# Patient Record
Sex: FEMALE | Race: WHITE
[De-identification: names, ages, dates, MRNs, and addresses within clinical notes are randomized per-mention and may not be internally consistent; named-entity substitution may affect disease eponyms.]

---

## 2017-08-03 NOTE — UC
Abdominal Pain Female HPI





- HPI Summary


HPI Summary: 





Abdominal pain, cramping, gas, discomfort, frequent very small loose/formed 

stools starting 3 nights ago.  had similar symptoms starting 2 days 

before pt, but his symptoms resolved within about 48 hours. Pt is just 

concerned that her  recovered quickly and her symptoms are persistent. 

Has nausea without vomiting but eating helps her belly sx. No blood in stool, 

denies urinary or vaginal symptoms. Has some low back pain with radiation down 

the legs. 





- History of Current Complaint


Hx Obtained From: Patient


Pregnant?: No


Onset/Duration: Sudden Onset, Lasting Days


Timing: Constant


Severity Initially: Moderate


Severity Currently: Moderate


Location: Diffuse


Radiates: Yes


Radiates to: Back


Character: Aching, Cramping


Aggravating Factor(s): Nothing


Alleviating Factor(s): Bowel Movement, Other: - eating/drinking


Associated Signs and Symptoms: Positive: Decreased Appetite, Nausea.  Negative: 

Diaphoresis, Fever, Cough, Constipation, Blood in Stool, Urinary Symptoms, 

Vaginal Bleeding, Vomiting





<Sofie Cordero - Last Filed: 08/03/17 17:03>





<Vesna Wiseman - Last Filed: 08/03/17 17:44>





- History of Current Complaint


Chief Complaint: UCGI


Stated Complaint: GASTRIC/GAS


Time Seen by Provider: 08/03/17 16:43


Allergies/Adverse Reactions: 


 Allergies











Allergy/AdvReac Type Severity Reaction Status Date / Time


 


Penicillins Allergy  Rash Verified 08/03/17 16:39














PMH/Surg Hx/FS Hx/Imm Hx


Previously Healthy: Yes





- Surgical History


Surgical History: None





- Family History


Known Family History: Positive: Hypertension





- Social History


Lives: With Family


Alcohol Use: None


Substance Use Type: None


Smoking Status (MU): Never Smoked Tobacco





<Sofie Cordero - Last Filed: 08/03/17 17:03>





Review of Systems


Constitutional: Negative


Skin: Negative


Eyes: Negative


ENT: Negative


Respiratory: Negative


Cardiovascular: Negative


Gastrointestinal: Abdominal Pain, Diarrhea, Nausea


Genitourinary: Negative


Motor: Negative


Neurovascular: Negative


Musculoskeletal: Negative


Neurological: Negative


Psychological: Negative


All Other Systems Reviewed And Are Negative: Yes





<Sofie Cordero - Last Filed: 08/03/17 17:03>





Physical Exam


Triage Information Reviewed: Yes


Appearance: Well-Appearing, No Pain Distress, Well-Nourished


Vital Signs: 


 Initial Vital Signs











Temp  98.7 F   08/03/17 16:35


 


Pulse  81   08/03/17 16:35


 


Resp  12   08/03/17 16:35


 


BP  148/99   08/03/17 16:35


 


Pulse Ox  100   08/03/17 16:35











Vital Signs Reviewed: Yes


Eye Exam: Normal, Other - PERRL


Eyes: Positive: Conjunctiva Clear


ENT Exam: Normal


ENT: Positive: Normal ENT inspection, Hearing grossly normal, Pharynx normal, 

TMs normal


Dental Exam: Normal


Dental: Negative: Gross Decay/Caries @, Abscess @


Neck exam: Normal


Neck: Positive: Supple, Nontender, No Lymphadenopathy


Respiratory Exam: Normal


Respiratory: Positive: Chest non-tender, Lungs clear, Normal breath sounds, No 

respiratory distress, No accessory muscle use


Cardiovascular Exam: Normal


Cardiovascular: Positive: RRR, No Murmur


Abdomen Description: Positive: Nontender, No Organomegaly, Soft.  Negative: 

Bruit, CVA Tenderness (R), CVA Tenderness (L), Hepatomegaly, McBurney's Point 

Tenderness, Peritoneal Signs


Musculoskeletal Exam: Normal


Neurological Exam: Normal


Neurological: Positive: Alert


Psychological Exam: Normal


Skin Exam: Normal





<Sofie Cordero - Last Filed: 08/03/17 17:03>


Vital Signs: 


 Initial Vital Signs











Temp  98.7 F   08/03/17 16:35


 


Pulse  81   08/03/17 16:35


 


Resp  12   08/03/17 16:35


 


BP  148/99   08/03/17 16:35


 


Pulse Ox  100   08/03/17 16:35














<Vesna Wiseman - Last Filed: 08/03/17 17:44>





Abd Pain Female Course/Dx





- Differential Dx/Diagnosis


Provider Diagnoses: Acute enteritis





<Sofie Cordero - Last Filed: 08/03/17 17:03>





Discharge





<Sofie Cordero - Last Filed: 08/03/17 17:03>





<Vesna Wiseman - Last Filed: 08/03/17 17:44>





- Discharge Plan


Condition: Stable


Disposition: HOME


Prescriptions: 


Dicyclomine CAP* [Bentyl CAP*] 10 mg PO TID PRN #12 cap


 PRN Reason: cramping


Patient Education Materials:  Acute Diarrhea (ED)


Additional Instructions: 


As we discussed, I do not see any "red flags" in your symptoms or on exam. I 

expect your stomach cramping and bloating to normalize within the next 2-3 days 

(it may take another couple days for your bowel habits to normalize as well), 

as this is most likely a GI virus. 





If symptoms persist or worsen, please fill the specimen containers and return 

to the lab for further testing.





If you have fever, worsening pain, pain focused in one area of the belly, 

bloody stools, or fainting, please go to the emergency department right away.





Attestation Statement


User Type: Provider - I was available for consult. This patient was seen by the 

NUPUR. The patient was not presented to, seen by, or examined by me. -Alexandra





<Vesna Wiseman - Last Filed: 08/03/17 17:44>

## 2018-08-16 ENCOUNTER — HOSPITAL ENCOUNTER (INPATIENT)
Dept: HOSPITAL 25 - ED | Age: 58
LOS: 3 days | Discharge: HOME | DRG: 313 | End: 2018-08-19
Attending: ORTHOPAEDIC SURGERY | Admitting: FAMILY MEDICINE
Payer: COMMERCIAL
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
## 2018-08-16 DIAGNOSIS — S82.841B: ICD-10-CM

## 2018-08-16 DIAGNOSIS — S93.431A: Primary | ICD-10-CM

## 2018-08-16 DIAGNOSIS — L23.7: ICD-10-CM
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
## 2018-08-16 DIAGNOSIS — Z88.0: ICD-10-CM

## 2018-08-16 LAB
HCT VFR BLD AUTO: 39 % (ref 35–47)
HGB BLD-MCNC: 13.2 G/DL (ref 12–16)
INR PPP/BLD: 0.88 (ref 0.77–1.02)
MCH RBC QN AUTO: 32 PG (ref 27–31)
MCHC RBC AUTO-ENTMCNC: 34 G/DL (ref 31–36)
MCV RBC AUTO: 96 FL (ref 80–97)
PLATELET # BLD AUTO: 204 10^3/UL (ref 150–450)
RBC # BLD AUTO: 4.08 10^6/UL (ref 4–5.4)
WBC # BLD AUTO: 7.1 10^3/UL (ref 3.5–10.8)

## 2018-08-16 PROCEDURE — 85610 PROTHROMBIN TIME: CPT

## 2018-08-16 PROCEDURE — 0QSJ04Z REPOSITION RIGHT FIBULA WITH INTERNAL FIXATION DEVICE, OPEN APPROACH: ICD-10-PCS | Performed by: ORTHOPAEDIC SURGERY

## 2018-08-16 PROCEDURE — 86850 RBC ANTIBODY SCREEN: CPT

## 2018-08-16 PROCEDURE — 76000 FLUOROSCOPY <1 HR PHYS/QHP: CPT

## 2018-08-16 PROCEDURE — 93005 ELECTROCARDIOGRAM TRACING: CPT

## 2018-08-16 PROCEDURE — 85014 HEMATOCRIT: CPT

## 2018-08-16 PROCEDURE — 80048 BASIC METABOLIC PNL TOTAL CA: CPT

## 2018-08-16 PROCEDURE — 85049 AUTOMATED PLATELET COUNT: CPT

## 2018-08-16 PROCEDURE — 36415 COLL VENOUS BLD VENIPUNCTURE: CPT

## 2018-08-16 PROCEDURE — 86900 BLOOD TYPING SEROLOGIC ABO: CPT

## 2018-08-16 PROCEDURE — 86901 BLOOD TYPING SEROLOGIC RH(D): CPT

## 2018-08-16 PROCEDURE — 87350 HEPATITIS BE AG IA: CPT

## 2018-08-16 PROCEDURE — 86703 HIV-1/HIV-2 1 RESULT ANTBDY: CPT

## 2018-08-16 PROCEDURE — 85027 COMPLETE CBC AUTOMATED: CPT

## 2018-08-16 PROCEDURE — 86803 HEPATITIS C AB TEST: CPT

## 2018-08-16 PROCEDURE — 0QSG04Z REPOSITION RIGHT TIBIA WITH INTERNAL FIXATION DEVICE, OPEN APPROACH: ICD-10-PCS | Performed by: ORTHOPAEDIC SURGERY

## 2018-08-16 PROCEDURE — 80053 COMPREHEN METABOLIC PANEL: CPT

## 2018-08-16 PROCEDURE — 99285 EMERGENCY DEPT VISIT HI MDM: CPT

## 2018-08-16 PROCEDURE — 85018 HEMOGLOBIN: CPT

## 2018-08-16 PROCEDURE — 86707 HEPATITIS BE ANTIBODY: CPT

## 2018-08-16 RX ADMIN — CEFAZOLIN SCH MLS/HR: 330 INJECTION, POWDER, FOR SOLUTION INTRAMUSCULAR; INTRAVENOUS at 19:02

## 2018-08-16 NOTE — RAD
Indication: RIGHT ankle and lower extremity pain post fall. Ankle fracture.



Comparison: Ankle radiographs of the same date documenting a displaced bimalleolar

fracture.



Technique: AP and crosstable lateral views RIGHT lower leg.



REPORT AND IMPRESSION: 

#.  Refer to dedicated ankle report for description of bimalleolar fracture. 

#.  Negative for more proximal fracture of the tibia or fibula. 

#.  Normal alignment at the proximal tibiofibular articulation. 

#.  Distal soft tissue swelling.

## 2018-08-16 NOTE — HP
H&P (Free Text)


History and Physical: 


Orthopedic consultation


Attending Provider: Dr. Jc Solis


Date of Consultation: 8/16/18


Date of Admission: 8/16/18


CC: open medial malleolus fracture 





History:


Patient is a 58 year old female with no pertinent past medical history who 

suffered a mechanical fall at her summer home here in [place] while vacuuming. 

She fell backwards down two steps resulting in immediate right ankle pain and 

deformity at 1200 this afternoon. She attempted reduction on her own with some 

success, such that bone was no longer protruding from her skin. She was 

immediately unable to bear weight and was brought in by ambulance to [facility]. Her 

pain is currently well controlled and is  localized to the right ankle without 

radiation. It is better with rest worse with any movement. She has no pain at 

other joints and reports no other injury. She did not hit her hear or lose 

consciousness when she fell . She had no shortness of breath, chest pain, 

dizziness or nausea associated with the fall. Her last meal was at 8 am, she 

has had nothing to eat or drink since. She has no history of heart attack, 

stroke, blood clot.





Allergies: Penicillin causing rash in childhood 





Surgical History: none





Past medical history: Poison ivy, currently on day 4/5 prednisone. No other 

reported. 





Family history: No history of adverse effects with anesthesia





Social: . Lives in [facility]. In [place] for the Summer. Works as an 

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


Nonsmoker, occasional alcohol use.





Review of Systems:


General: Negative for Fever, Chills, recent illness


HEENT: Negative for change in vision, headache, head trauma


Cardio: No irregular heartbeats, Chest Pain or history of MI


Resp: No Shortness Of Breath or Cough


Abd: no Abdominal Pain, Vomiting, Diarrhea, Nausea


:no dysuria


MSK: Right ankle pain and open fracture 


Neuro: Sensation intact throughout all extremities without numbness or 

parasthesias


Heme: no history of blood clot 


Skin: poison ivy on prednisone x 4 days last day tomorrow 





Physical Exam:


General:  NAD, alert


HEENT: NCAT. EOMi, moist mucus membranes


Cardio: S1S2 RRR


Resp: CTA BL. no wheezes,rales or rhonchi


ABD: bowel sounds normoactive in all 4 quadrants. Nontender to palpation, no 

guarding or rigidity.


Upper Extremities: SKin envelope intact, no obvious deformity, nontender to 

palpation. Active flexion and extension of digits, wrists, elbows without 

associated pain. Shoulders with nonpainful active forward flexion and abduction.


LLE: Skin envelope intact, no obvious deformity, nontender to palpation. Active 

nonpainful flexion and extension of digits, ankle, knee and hip.


RLE: extremity is warm without mottling, erythema or edema. There is a 5 CM 

linear laceration over the medial ankle with the medial malleolus visible 

though not protruding. There is obvious valgus angulation of the ankle. ROM 

ankle not assessed due to excessive pain and obvious open fracture. DP pulse 2+

, capillary refill less than two seconds distally. Sensation intact to light 

touch throughout the RLE including the dorsum, plantar aspect, medial, lateral 

and 1st webspace of the right foot. There is no tenderness to palpation of the 

calf, knee, upper leg or hip. Patient is able to actively flex and extend at 

the knee and hip without pain.


Skin: RLE as stated above, crusted over red papules and pustules of bilateral 

upper extremities ( poison ivy)





Vitals:


Temp: 98.3 F


Heart Rate: 68 bpm


Respiratory rate: 16


O2 Sat: 100%


Blood Pressure: 114/72





Labs:


H&H 13.2/39


Plt: 204


Sodium 138


Potassium: 3.6


Cr: 0.68


INR: 0.88





Diagnostic Studies:





Right ankle Xray: 


FINDINGS:  There is a transverse slightly comminuted intra-articular fracture 

of the


distal fibula. The distal fragment is displaced posterior one half shaft 

diameter and


demonstrates posterior and lateral angulation relative the proximal fragment. 

In addition


there is gas within the soft tissues adjacent to the fracture suggestive of an 

open


fracture.





There is medial subluxation of the distal tibia relative to the talus. There is 

a


transverse intra-articular fracture of the medial malleolus. The distal 

fragment is


displaced lateral and inferior relative to the proximal fragment. There is gas 

in the


adjacent soft tissues suggestive of an open fracture.





IMPRESSION:  COMMINUTED DISPLACED ANGULATED BIMALLEOLAR FRACTURE SUBLUXATION. 

THERE IS GAS


IN THE SOFT TISSUES ADJACENT TO BOTH FRACTURES SUGGESTIVE OF OPEN FRACTURES.





EKG INTERPRETATION


ECG Report 


 Patient Name HAYDEN LANG


 Birthdate 1960


 Sex F


 Medical Record Number [id number]


 Account Number [id number]


 Order Number [id number]


 Date of ECG 08/16/2018 14:08:09


 Interpretation 


 --------------------------


Sinus rhythm.normal P axis, V-rate  60- 99 


- NORMAL ECG -








Assessment: Open fracture right ankle 





Plan: Patient agrees to ORIF right ankle. This case was discussed with Dr. Solis who agrees to bring Ms. Lang to the OR for ORIF right ankle. She 

can be brought up to the OR pre-op holding area now, She will remain NPO.  STAT 

CBC, BMP, type and screen, INR was ordered. The risks and benefits of surgery 

including but not limited to infection, wound problems, nerve injury, neuroma, 

RSD, persistent symptoms, blood clot, failure of surgery and the remote chance 

of catastrophic complication including loss of limb or death were discussed 

with this patient by Dr Solis, she elected to proceed.

## 2018-08-16 NOTE — RAD
Indication: RIGHT knee pain post fall. RIGHT ankle fracture.



Comparison: RIGHT ankle exam of the same date.



Technique: RIGHT knee: AP and crosstable lateral views. 



Report: Normal articular alignment. Negative for joint effusion or fracture. Mild

osteophytosis and medial joint space narrowing. Unremarkable soft tissue contours. 



IMPRESSION: 

#. No radiographic evidence for traumatic RIGHT knee injury.

## 2018-08-16 NOTE — RAD
INDICATION:  Right ankle injury.



TECHNIQUE: 3 views of the right ankle were obtained.



FINDINGS:  There is a transverse slightly comminuted intra-articular fracture of the

distal fibula. The distal fragment is displaced posterior one half shaft diameter and

demonstrates posterior and lateral angulation relative the proximal fragment. In addition

there is gas within the soft tissues adjacent to the fracture suggestive of an open

fracture.



There is medial subluxation of the distal tibia relative to the talus. There is a

transverse intra-articular fracture of the medial malleolus. The distal fragment is

displaced lateral and inferior relative to the proximal fragment. There is gas in the

adjacent soft tissues suggestive of an open fracture.



IMPRESSION:  COMMINUTED DISPLACED ANGULATED BIMALLEOLAR FRACTURE SUBLUXATION. THERE IS GAS

IN THE SOFT TISSUES ADJACENT TO BOTH FRACTURES SUGGESTIVE OF OPEN FRACTURES.

## 2018-08-16 NOTE — ED
Lower Extremity





- HPI Summary


HPI Summary: 


Pt is a 57 y/o F BIBA c/o RLE pain onset ~1349 this date. Pain is rated a 2/10 

and described as an ache, per triage. Assoc. Sx: RLE pain, decreased. Denies: 

fever. She reports falling backwards down 2 stairs and landed on R leg wrong. 

Allev. Sx: Pulling knee towards body. She notes having eaten breakfast this AM. 

Aggr. factors: movement of RLE. Allev. factors: compression of RLE. 





- History of Current Complaint


Chief Complaint: EDExtremityLower


Stated Complaint: RT ANKLE


Time Seen by Provider: 08/16/18 13:49


Hx Obtained From: Patient


Mechanism Of Injury: Fall From Height Of: - 2 stairs


Onset of Pain: Immediate


Onset/Duration: Still Present


Severity Currently: Mild


Pain Intensity: 2


Pain Scale Used: 0-10 Numeric


Timing: Constant


Associated Signs And Symptoms: Positive: Other - POS: RLE pain.  Negative: Fever


Aggravating Factor(s): Movement


Alleviating Factor(s): Other - compression


Able to Bear Weight: No





- Allergies/Home Medications


Allergies/Adverse Reactions: 


 Allergies











Allergy/AdvReac Type Severity Reaction Status Date / Time


 


MS Penicillins [Penicillins] Allergy  Rash Verified 08/03/17 16:39














PMH/Surg Hx/FS Hx/Imm Hx


Endocrine/Hematology History: 


   Denies: Hx Diabetes


Cardiovascular History: 


   Denies: Hx Coronary Artery Disease, Hx Hypertension


Infectious Disease History: No


Infectious Disease History: 


   Denies: Traveled Outside the [place] in Last 30 Days





- Family History


Known Family History: Positive: Hypertension, Diabetes


   Negative: Cardiac Disease





- Social History


Occupation: Employed Full-time


Lives: With Family


Alcohol Use: Occasionally


Substance Use Type: Reports: None


Hx Tobacco Use: No


Smoking Status (MU): Never Smoked Tobacco





Review of Systems


Negative: Fever, Chills, Fatigue, Skin Diaphoresis


Negative: Photophobia, Blurred Vision, Diplopia, Drainage, Erythema


Negative: Epistaxis, Dental Pain, Sore Throat, Ear Ache, Nasal Discharge


Negative: Palpitations, Chest Pain


Negative: Shortness Of Breath, Cough


Negative: Abdominal Pain, Vomiting, Diarrhea, Nausea


Negative: burning, dysuria, discharge, frequency, flank pain, hematuria, 

incontinence, pain, urgency


Positive: Decreased ROM - RLE, Other - POS: RLE pain.  Negative: Arthralgia, 

Myalgia, Edema


Negative: Rash, Bruising


Negative: Headache, Weakness, Paresthesia, Numbness, Slurred Speech


Negative: Anxious, Depressed


All Other Systems Reviewed And Are Negative: Yes





Physical Exam





- Summary


Physical Exam Summary: 


Constitutional: Well-developed, Well-nourished, Alert. (-) Distressed


Skin: Warm, Dry


HENT: Normocephalic; Atraumatic


Eyes: Conjunctiva normal


Neck: Musculoskeletal ROM normal neck. (-) JVD, (-) Stridor, (-) Tracheal 

deviation


Cardio: Rhythm regular, rate normal, Heart sounds normal; Intact distal pulses; 

The pedal pulses are 2+ and symmetric. Radial pulses are 2+ and symmetric. (-) 

Murmur


Pulmonary/Chest wall: Effort normal. (-) Respiratory distress, (-) Wheezes, (-) 

Rales


Abd: Soft, (-) epigastric tenderness, (-) Distension, (-) Guarding, (-) Rebound


Musculoskeletal: (-) Edema


Lymph: (-) Cervical adenopathy


Neuro: Alert, Oriented x3


Psych: Mood and affect Normal


RLE: medially angulated, medial malleolus open.


Triage Information Reviewed: Yes


Vital Signs On Initial Exam: 


 Initial Vitals











Temp Pulse Resp BP Pulse Ox


 


 98.3 F   68   16   114/72   100 


 


 08/16/18 13:47  08/16/18 13:47  08/16/18 13:47  08/16/18 13:47  08/16/18 13:47











Vital Signs Reviewed: Yes





Diagnostics





- Vital Signs


 Vital Signs











  Temp Pulse Resp BP Pulse Ox


 


 08/16/18 13:47  98.3 F  68  16  114/72  100














- Laboratory


Result Diagrams: 


 08/16/18 14:00





 08/16/18 14:00


Lab Statement: Any lab studies that have been ordered have been reviewed, and 

results considered in the medical decision making process.





- Radiology


  ** Knee XR


Xray Interpretation: No Acute Changes - IMPRESSION: No radiographic evidence 

for traumatic RIGHT knee injury.


Radiology Interpretation Completed By: Radiologist - Report has been reviewed 

by provider and radiologist.





- EKG


  ** 1408


Cardiac Rate: NL - 65 bpm


EKG Rhythm: Sinus Rhythm


ST Segment: Normal


Ectopy: None





Discharge





- Sign-Out/Discharge


Documenting (check all that apply): Patient Departure





- Discharge Plan


Condition: Stable


Disposition: ADMITTED TO [facility]


Referrals: 


No Primary Care Phys,NOPCP [Primary Care Provider] -

## 2018-08-17 LAB
HCT VFR BLD AUTO: 35 % (ref 35–47)
HGB BLD-MCNC: 12.2 G/DL (ref 12–16)
PLATELET # BLD AUTO: 178 10^3/UL (ref 150–450)

## 2018-08-17 RX ADMIN — ENOXAPARIN SODIUM SCH MG: 40 INJECTION SUBCUTANEOUS at 12:33

## 2018-08-17 RX ADMIN — TRAMADOL HYDROCHLORIDE PRN MG: 50 TABLET, FILM COATED ORAL at 15:13

## 2018-08-17 RX ADMIN — MAGNESIUM HYDROXIDE SCH: 400 SUSPENSION ORAL at 20:46

## 2018-08-17 RX ADMIN — CEFAZOLIN SCH MLS/HR: 330 INJECTION, POWDER, FOR SOLUTION INTRAMUSCULAR; INTRAVENOUS at 12:33

## 2018-08-17 RX ADMIN — CEFAZOLIN SCH MLS/HR: 330 INJECTION, POWDER, FOR SOLUTION INTRAMUSCULAR; INTRAVENOUS at 03:57

## 2018-08-17 RX ADMIN — MAGNESIUM HYDROXIDE SCH: 400 SUSPENSION ORAL at 09:35

## 2018-08-17 RX ADMIN — CEFAZOLIN SCH MLS/HR: 330 INJECTION, POWDER, FOR SOLUTION INTRAMUSCULAR; INTRAVENOUS at 20:47

## 2018-08-17 RX ADMIN — OXYCODONE HYDROCHLORIDE PRN MG: 5 CAPSULE ORAL at 20:46

## 2018-08-17 RX ADMIN — MORPHINE SULFATE PRN MG: 2 INJECTION, SOLUTION INTRAMUSCULAR; INTRAVENOUS at 23:48

## 2018-08-17 NOTE — RAD
INDICATION: Fall, right ankle fracture



COMPARISONS: August 16, 2018



TECHNIQUE: Fluoroscopy was provided for a surgical procedure. Total fluoroscopy time is:

54 seconds



FINDINGS: Spot images demonstrate internal fixation of the distal tibia and distal fibula.



IMPRESSION: FLUOROSCOPY WAS PROVIDED FOR A SURGICAL PROCEDURE



CPT II Codes:

## 2018-08-17 NOTE — PN
Progress Note





- Progress Note


Date of Service: 08/17/18


SOAP: 


Subjective:


POD #1 Right ankle ORIF with I&D for open fx. Doing well, no complaints of 

pain. Denies CP/SOB, f/c or calf pain.








Objective:


Vitals: 








Temp Pulse Resp BP Pulse Ox


 


 99.0 F   60   16   100/59   98 


 


 08/17/18 07:49  08/17/18 07:49  08/17/18 07:49  08/17/18 07:49  08/17/18 07:49








Gen: A&Ox3, NAD at rest sitting up in bed


RLE: Splint C/D/I, +f/e at MTPs, N/V intact








Assessment:


POD #1 Right ankle ORIF with I&D for open fx








Plan:


Pt to receive 24 hours of post op IV abx


D/C tomorrow


Cont NWB RLE, PT/OT

## 2018-08-18 LAB
HCT VFR BLD AUTO: 35 % (ref 35–47)
HGB BLD-MCNC: 12.1 G/DL (ref 12–16)
PLATELET # BLD AUTO: 163 10^3/UL (ref 150–450)

## 2018-08-18 RX ADMIN — MAGNESIUM HYDROXIDE SCH ML: 400 SUSPENSION ORAL at 23:02

## 2018-08-18 RX ADMIN — OXYCODONE HYDROCHLORIDE PRN MG: 5 CAPSULE ORAL at 11:57

## 2018-08-18 RX ADMIN — CEFAZOLIN SCH MLS/HR: 330 INJECTION, POWDER, FOR SOLUTION INTRAMUSCULAR; INTRAVENOUS at 23:39

## 2018-08-18 RX ADMIN — ACETAMINOPHEN PRN MG: 325 TABLET ORAL at 07:53

## 2018-08-18 RX ADMIN — CEFAZOLIN SCH MLS/HR: 330 INJECTION, POWDER, FOR SOLUTION INTRAMUSCULAR; INTRAVENOUS at 03:26

## 2018-08-18 RX ADMIN — OXYCODONE HYDROCHLORIDE PRN MG: 5 CAPSULE ORAL at 20:46

## 2018-08-18 RX ADMIN — MAGNESIUM HYDROXIDE SCH: 400 SUSPENSION ORAL at 23:39

## 2018-08-18 RX ADMIN — MAGNESIUM HYDROXIDE SCH: 400 SUSPENSION ORAL at 07:07

## 2018-08-18 RX ADMIN — CEFAZOLIN SCH MLS/HR: 330 INJECTION, POWDER, FOR SOLUTION INTRAMUSCULAR; INTRAVENOUS at 10:54

## 2018-08-18 RX ADMIN — ACETAMINOPHEN PRN MG: 325 TABLET ORAL at 15:51

## 2018-08-18 RX ADMIN — TRAMADOL HYDROCHLORIDE PRN MG: 50 TABLET, FILM COATED ORAL at 23:01

## 2018-08-18 RX ADMIN — MORPHINE SULFATE PRN MG: 2 INJECTION, SOLUTION INTRAMUSCULAR; INTRAVENOUS at 05:20

## 2018-08-18 RX ADMIN — OXYCODONE HYDROCHLORIDE PRN MG: 5 CAPSULE ORAL at 15:51

## 2018-08-18 RX ADMIN — MORPHINE SULFATE PRN MG: 2 INJECTION, SOLUTION INTRAMUSCULAR; INTRAVENOUS at 23:28

## 2018-08-18 RX ADMIN — OXYCODONE HYDROCHLORIDE PRN MG: 5 CAPSULE ORAL at 03:31

## 2018-08-18 RX ADMIN — ENOXAPARIN SODIUM SCH MG: 40 INJECTION SUBCUTANEOUS at 11:57

## 2018-08-18 RX ADMIN — OXYCODONE HYDROCHLORIDE PRN MG: 5 CAPSULE ORAL at 07:42

## 2018-08-18 NOTE — PN
Progress Note





- Progress Note


Date of Service: 08/18/18


SOAP: 


Subjective:


POD #2 right ankle ORIF with I&D for open fx. Doing ok, had increased pain 

through night. Denies CP/SOB, f/c, n/v or calf pain.





Objective:


Vitals: 








Temp Pulse Resp BP Pulse Ox


 


 98.9 F   74   14   98/53   98 


 


 08/18/18 03:30  08/18/18 03:30  08/18/18 07:42  08/18/18 03:30  08/18/18 03:30








Gen: A&Ox3, NAD at rest laying in bed


RLE: Splint C/D/I, +f/e at MTPs, sensation intact, brisk cap refill





Assessment:


POD #2 Right ankle ORIF with I&D for open fx





Plan:


Cont IV abx for 48hrs post op


NWB RLE with walker


Cont PT


D/C tomorrow after IV abx course complete

## 2018-08-19 VITALS — SYSTOLIC BLOOD PRESSURE: 117 MMHG | DIASTOLIC BLOOD PRESSURE: 70 MMHG

## 2018-08-19 LAB
HCT VFR BLD AUTO: 36 % (ref 35–47)
HGB BLD-MCNC: 12.5 G/DL (ref 12–16)
PLATELET # BLD AUTO: 163 10^3/UL (ref 150–450)

## 2018-08-19 RX ADMIN — OXYCODONE HYDROCHLORIDE PRN MG: 5 CAPSULE ORAL at 01:26

## 2018-08-19 RX ADMIN — OXYCODONE HYDROCHLORIDE PRN MG: 5 CAPSULE ORAL at 05:37

## 2018-08-19 RX ADMIN — MAGNESIUM HYDROXIDE SCH ML: 400 SUSPENSION ORAL at 09:23

## 2018-08-19 RX ADMIN — ACETAMINOPHEN PRN MG: 325 TABLET ORAL at 12:24

## 2018-08-19 RX ADMIN — CEFAZOLIN SCH MLS/HR: 330 INJECTION, POWDER, FOR SOLUTION INTRAMUSCULAR; INTRAVENOUS at 08:00

## 2018-08-19 RX ADMIN — TRAMADOL HYDROCHLORIDE PRN MG: 50 TABLET, FILM COATED ORAL at 14:03

## 2018-08-19 RX ADMIN — TRAMADOL HYDROCHLORIDE PRN MG: 50 TABLET, FILM COATED ORAL at 08:07

## 2018-08-19 RX ADMIN — ACETAMINOPHEN PRN MG: 325 TABLET ORAL at 01:26

## 2018-08-19 RX ADMIN — ENOXAPARIN SODIUM SCH MG: 40 INJECTION SUBCUTANEOUS at 12:21

## 2018-08-19 NOTE — DS
DISCHARGE SUMMARY:

 

DATE OF ADMISSION:  08/16/18

 

DATE OF DISCHARGE:  08/19/18

 

PROVIDER:  Dr. Jc Solis * (DICTATED BY ELIGIO FIGUEROA)

 

ADMITTING DIAGNOSIS:  Open fracture of the right ankle.

 

DISCHARGE DIAGNOSIS:  Open fracture of the right ankle, status post right ankle 
ORIF with I and D.

 

HISTORY OF PRESENT ILLNESS:  Ms. Lang is a 58-year-old healthy female who 
presented to [facility] Emergency Room on 08/16/18 after a fall.  
She was noted to have pain and deformity with an open wound at the right ankle.
  X-rays were taken, she was found to have an open fracture of the right ankle.
  She was admitted to [facility] at that time for further treatment.

 

HOSPITAL COURSE:  On 08/16/18, the patient was admitted to [facility] and underwent a successful right ankle ORIF with irrigation and 
debridement for fracture.  She was splinted and recovered briefly in the 
postanesthesia care unit. She was transferred to the short-stay surgical unit 
where she has stayed for 48 hours of IV antibiotics.  She has worked with 
physical therapy and has been doing well and complaint was nonweightbearing.  
The pain has been well controlled with oral pain medication. She has had no 
paresthesias or numbness in the toes and she is able to move them easily.  Her 
vital signs have been stable throughout her hospital course.  She has not had 
any fever or signs of infection.  On postop day 3, the IV antibiotic course was 
completed and the patient was found to stable for discharge home.

 

DISCHARGE INSTRUCTIONS:  The patient will be nonweightbearing on the right 
lower extremity with the use of a walker or crutches.  She was also offered a 
knee scooter, which she will call to get a prescription for.  She will keep her 
splint clean and dry until further followup.  She will elevate the leg 
frequently and apply ice as needed for pain.  She is understanding to call the 
office with any questions or concerns.  She will go directly to the ER with any 
chest pain, shortness of breath, fever greater than 101.5 or calf pain.

 

DISCHARGE MEDICATIONS:  The patient will use oxycodone 5 mg p.o. q.6 hours 
p.r.n. pain, Tylenol 650 mg p.o. q.6 hours p.r.n. pain, ibuprofen 600 mg p.o. 
q.6 hours p.r.n. pain, aspirin 325 mg daily to prevent clots, docusate 100 mg 
p.o. b.i.d. p.r.n. constipation.

 

All of the patient's questions were answered to her satisfaction.

 

____________________________________ ELIGIO FIGUEROA

 

[id number]/[facility] #: [id number]

SAMUEL

## 2018-08-19 NOTE — PN
Progress Note





- Progress Note


Date of Service: 08/19/18


SOAP: 


Subjective:


POD #3 Right ankle ORIF with I&D for open fx. Doing well, ready for d/c home. 

Pain controlled with meds. Denies CP/SOB, calf pain





Objective:


Vitals: 











Temp Pulse Resp BP Pulse Ox


 


 98.8 F   64   16   100/62   97 


 


 08/19/18 04:16  08/19/18 04:16  08/19/18 05:37  08/19/18 04:16  08/19/18 04:16








Gen: A&O x3, NAD at rest


RLE: Splint C/D/I, +f/e at MTPs, N/V intact





Assessment:


POD #3 Right ankle ORIF with I&D for open fx





Plan:


Pt completed 48hrs of IV abx, comfortable for d/c home


ASA 325mg q day for DVT ppx


NWB RLE


F/u with Dr. Solis 7-10 days post op

## 2018-08-20 NOTE — OP
Operative Report - Blank





- Operative Report


Date of Operation: 08/16/18


Note: 


PATIENT: Elinor Lang





YOB: 1960





DATE OF SURGERY: 8/16/2018





SURGEON: Jc Solis MD





ASSISTANT: ELIGIO Marshall, whos assistance was necessary for positioning, 

retraction, help with instrumentation, and closure.





ANESTHESIOLOGIST: Dr. Yoder





PREOPERATIVE DIAGNOSIS: Right Gustilo grade II open bimalleolar ankle fracture-

dislocation and disruption of the distal tibia-fibula syndesmosis.





POSTOPERATIVE DIAGNOSIS: Right Gustilo grade II open bimalleolar ankle fracture-

dislocation and disruption of the distal tibia-fibula syndesmosis.





OPERATION:


1.  Right open ankle fracture irrigation and debridement


2.  Right bimalleolar ankle fracture open reduction and internal fixation.


3.  Stress views performed by surgeon utilizing fluoroscopy under anesthesia.





ANESTHESIA: GETA





IMPLANTS: Arthrex plate and screws





TOURNIQUET TIME: Less than two hours with a well-padded thigh tourniquet at 

250mmHg





SPECIMENS: none





ESTIMATED BLOOD LOSS: minimal





COMPLICATIONS: none





STATUS: Stable from the operating room to the recovery room and then admitted 

to the hospital.





INDICATIONS FOR PROCEDURE:


Elinor sustained an open right ankle fracture-dislocation.  She was brought 

into the emergency room by ambulance. Both operative and non operative 

treatment alternatives were reviewed. Further, the nature and risks of surgery 

were reviewed in careful detail in the pre-operative holding area. Our 

discussions regarding the risks of surgery included, but were not limited to, 

infection, wound problems, nerve injury, neuroma, RSD, persistent symptoms, 

blood clot, nonunion, malunion, post-traumatic arthritis, hardware failure, 

failure of the surgery, and even the remote chance of catastrophic complication

, including loss of limb.





DESCRIPTION OF PROCEDURE:


The patient was seen in the preoperative holding unit and informed written 

consent was obtained.  The appropriate extremity was marked. The patient was 

then brought to the operating room and carefully positioned on the operating 

room table. Anesthesia was induced. All bony prominences were padded with great 

care. A well-padded thigh tourniquet was placed. A chlorhexidine based pre-

scrub was performed followed by a betadine prep and drape in standard sterile 

fashion. A surgical safety pause was then conducted in which we confirmed the 

appropriate patient, extremity, planned procedure, availability of equipment, 

indication and administration of prophylactic antibiotics, and DVT prophylaxis 

in the form of a compression boot on the non-surgical extremity. 





I began with Esmarch exsanguination of the limb and inflated the tourniquet.





I began by performing a thorough irrigation and debridement of the medial ankle 

wound, which measured 5 cm in length.  I debrided any nonviable tissue in the 

subcutaneous fascial and periosteal layers. There was no gross contamination of 

the wound.  The wound was then thoroughly irrigated with sterile saline and 

cystoscopy tubing.  I then extended the anterior aspect of the wound distally 

to help expose the fracture site for open reduction and internal fixation. 

Reduction of the medial malleolar fracture was obtained with a pointed 

reduction clamp. I placed guidewires for 4.0 mm cannulated screws. I confirmed 

the position of the guidewires fluoroscopically. I then overdrilled both wires 

and placed two partially threaded 4.0 mm cannulated screws.





I then utilized a laterally based incision overlying the distal fibula. Great 

care was taken to protect the superficial peroneal nerve, which was not 

visualized within the field of view. I dissected down through the soft tissue 

layers to expose the distal fibula. I then exposed the fracture.  There was 

substantial comminution at the fracture site.  Some of these comminuted pieces 

were quite small.  Because of this, an anatomic reduction at the fracture site 

was not possible.  I placed an anatomic plate distally at the fracture and then 

bridged the fracture site.  The small pieces of bone at the fracture site were 

reduced as well as they could be.  The syndesmosis was grossly disrupted.  I 

confirmed this fluoroscopically with cotton testing.  Therefore, I manually 

reduced the syndesmosis and placed two trans-syndesmotic screws.





Final fluoroscopic images were then obtained. At this point, we irrigated 

copiously and then closed the wounds in layers meticulously utilizing 3-0 

Monocryl for the deep and subdermal layers and skin staples laterally, and 3-0 

nylon medially for the skin. A sterile dressing was then applied followed by a 

splint with the ankle in a neutral position. 





The patient was then awakened from anesthesia and transferred to the recovery 

room in stable condition. There were no complications. All needle and sponge 

counts were correct at the end of the case.





This procedure required substantially more work than is typically needed for a 

bimalleolar ankle fracture.  Given the degree of comminution of the distal 

fibula, reduction was substantially more difficult.  Ensuring the restoration 

of length, alignment, and rotation was also substantially more difficult.  All 

of this led to increased intensity, time, and difficulty of the procedure.





ATTESTATION: I attest I was present and scrubbed and performed the critical 

portions of the procedure myself. 





POSTOPERATIVE PLAN:


The postop plan is for non-weight-bearing for an anticipated duration of 8 

weeks. Follow-up will be in 2 weeks.